# Patient Record
Sex: MALE | Race: WHITE | NOT HISPANIC OR LATINO | Employment: OTHER | URBAN - NONMETROPOLITAN AREA
[De-identification: names, ages, dates, MRNs, and addresses within clinical notes are randomized per-mention and may not be internally consistent; named-entity substitution may affect disease eponyms.]

---

## 2023-02-18 ENCOUNTER — HOSPITAL ENCOUNTER (EMERGENCY)
Facility: HOSPITAL | Age: 83
Discharge: HOME | End: 2023-02-18
Attending: EMERGENCY MEDICINE | Admitting: EMERGENCY MEDICINE

## 2023-02-18 VITALS
DIASTOLIC BLOOD PRESSURE: 75 MMHG | WEIGHT: 189.38 LBS | SYSTOLIC BLOOD PRESSURE: 140 MMHG | HEART RATE: 69 BPM | TEMPERATURE: 97.9 F | RESPIRATION RATE: 18 BRPM | OXYGEN SATURATION: 96 %

## 2023-02-18 DIAGNOSIS — T83.9XXA COMPLICATION OF FOLEY CATHETER, INITIAL ENCOUNTER (HCC): ICD-10-CM

## 2023-02-18 DIAGNOSIS — R31.9 HEMATURIA: Primary | ICD-10-CM

## 2023-02-18 LAB
ALBUMIN SERPL BCP-MCNC: 3.8 G/DL (ref 3.5–5)
ALP SERPL-CCNC: 76 U/L (ref 34–104)
ALT SERPL W P-5'-P-CCNC: 12 U/L (ref 7–52)
ANION GAP SERPL CALCULATED.3IONS-SCNC: 3 MMOL/L (ref 4–13)
APTT PPP: 35 SECONDS (ref 23–37)
AST SERPL W P-5'-P-CCNC: 21 U/L (ref 13–39)
BASOPHILS # BLD AUTO: 0.05 THOUSANDS/ÂΜL (ref 0–0.1)
BASOPHILS NFR BLD AUTO: 1 % (ref 0–1)
BILIRUB SERPL-MCNC: 1.53 MG/DL (ref 0.2–1)
BUN SERPL-MCNC: 21 MG/DL (ref 5–25)
CALCIUM SERPL-MCNC: 9.2 MG/DL (ref 8.4–10.2)
CHLORIDE SERPL-SCNC: 105 MMOL/L (ref 96–108)
CO2 SERPL-SCNC: 27 MMOL/L (ref 21–32)
CREAT SERPL-MCNC: 1.02 MG/DL (ref 0.6–1.3)
EOSINOPHIL # BLD AUTO: 0.06 THOUSAND/ÂΜL (ref 0–0.61)
EOSINOPHIL NFR BLD AUTO: 1 % (ref 0–6)
ERYTHROCYTE [DISTWIDTH] IN BLOOD BY AUTOMATED COUNT: 13.2 % (ref 11.6–15.1)
GFR SERPL CREATININE-BSD FRML MDRD: 69 ML/MIN/1.73SQ M
GLUCOSE SERPL-MCNC: 131 MG/DL (ref 65–140)
HCT VFR BLD AUTO: 43.5 % (ref 36.5–49.3)
HGB BLD-MCNC: 14.3 G/DL (ref 12–17)
IMM GRANULOCYTES # BLD AUTO: 0.03 THOUSAND/UL (ref 0–0.2)
IMM GRANULOCYTES NFR BLD AUTO: 0 % (ref 0–2)
INR PPP: 0.88 (ref 0.84–1.19)
LYMPHOCYTES # BLD AUTO: 0.97 THOUSANDS/ÂΜL (ref 0.6–4.47)
LYMPHOCYTES NFR BLD AUTO: 13 % (ref 14–44)
MCH RBC QN AUTO: 30.4 PG (ref 26.8–34.3)
MCHC RBC AUTO-ENTMCNC: 32.9 G/DL (ref 31.4–37.4)
MCV RBC AUTO: 92 FL (ref 82–98)
MONOCYTES # BLD AUTO: 0.73 THOUSAND/ÂΜL (ref 0.17–1.22)
MONOCYTES NFR BLD AUTO: 10 % (ref 4–12)
NEUTROPHILS # BLD AUTO: 5.84 THOUSANDS/ÂΜL (ref 1.85–7.62)
NEUTS SEG NFR BLD AUTO: 75 % (ref 43–75)
NRBC BLD AUTO-RTO: 0 /100 WBCS
PLATELET # BLD AUTO: 169 THOUSANDS/UL (ref 149–390)
PMV BLD AUTO: 10 FL (ref 8.9–12.7)
POTASSIUM SERPL-SCNC: 3.7 MMOL/L (ref 3.5–5.3)
PROT SERPL-MCNC: 6.7 G/DL (ref 6.4–8.4)
PROTHROMBIN TIME: 12.1 SECONDS (ref 11.6–14.5)
RBC # BLD AUTO: 4.71 MILLION/UL (ref 3.88–5.62)
SODIUM SERPL-SCNC: 135 MMOL/L (ref 135–147)
WBC # BLD AUTO: 7.68 THOUSAND/UL (ref 4.31–10.16)

## 2023-02-18 RX ORDER — LEVOTHYROXINE SODIUM 0.1 MG/1
100 TABLET ORAL DAILY
COMMUNITY

## 2023-02-18 RX ORDER — MULTIVITAMIN
1 TABLET ORAL DAILY
COMMUNITY

## 2023-02-18 RX ORDER — CETIRIZINE HYDROCHLORIDE 10 MG/1
CAPSULE, LIQUID FILLED ORAL
COMMUNITY

## 2023-02-18 RX ORDER — VIT C/B6/B5/MAGNESIUM/HERB 173 50-5-6-5MG
CAPSULE ORAL
COMMUNITY

## 2023-02-18 NOTE — ED CARE HANDOFF
Emergency Department Sign Out Note        Sign out and transfer of care from Dr Sutton Maris  See Separate Emergency Department note  The patient, Zach Wells, was evaluated by the previous provider for hematuria  Workup Completed:  CBC, CMP, PT/INR wnl  Undergoing CBI  ED Course / Workup Pending (followup): Reevaluate after CBI  ED Course as of 02/18/23 0942   Sat Feb 18, 2023   0938 Urine markedly cleared up  Still a very slight pink twinge, but no clots in the urine  Patient feels well  Plan to discharge  Procedures  Medical Decision Making  Patient reevaluated after CBI  Urine markedly cleared up  I did leave a message with the answering service at patient's urology office at [facility] for outpatient follow-up and to discuss patient's visit  No return call yet and patient was eager to leave  I advised patient call urologist's office on Monday morning  Return precautions were discussed  Patient discharged in stable condition  Complication of Sifuentes catheter, initial encounter [facility]): acute illness or injury  Hematuria: acute illness or injury  Amount and/or Complexity of Data Reviewed  Labs: ordered  Disposition  Final diagnoses:   Hematuria   Complication of Sifuentes catheter, initial encounter [facility])     Time reflects when diagnosis was documented in both MDM as applicable and the Disposition within this note     Time User Action Codes Description Comment    2/18/2023  5:37 AM Viola Hall Add [R31 9] Hematuria     2/18/2023  9:40 AM Marquis Sheth Add [T83  9XXA] Complication of Sifuentes catheter, initial encounter [facility])       ED Disposition     ED Disposition   Discharge    Condition   Stable    Date/Time   Sat Feb 18, 2023  5:37 AM    Comment   Zach Wells discharge to home/self care                 Follow-up Information     Follow up With Specialties Details Why Contact Info    Erika Jones MD [facility]  [address] [place] [address]          Patient's Medications   Discharge Prescriptions    No medications on file     No discharge procedures on file         ED Provider  Electronically Signed by     Mary Ceron MD  02/18/23 0654

## 2023-02-18 NOTE — ED PROVIDER NOTES
History  Chief Complaint   Patient presents with   • Blood in Urine     Pt reports he was seen at urology yesterday for scope and kwon insertion, reports hematuria since      Patient is an 82-year-old male presenting to the emergency department complaining of hematuria, patient was seen by his urologist yesterday and had a Kwon catheter placed for urinary retention with large postvoid residual, he had a scope performed in the office as well, he reports the first 2 times he emptied his leg bag it was clear yellow urine, however the last 2-3 have been bloody, he denies any fevers, no nausea or vomiting, denies any pain          Prior to Admission Medications   Prescriptions Last Dose Informant Patient Reported? Taking? Apple Cider Vinegar 188 MG CAPS   Yes Yes   Sig: Take by mouth   Cetirizine HCl (ZyrTEC Allergy) 10 MG CAPS   Yes Yes   Sig: Take by mouth   Multiple Vitamin (multivitamin) tablet   Yes Yes   Sig: Take 1 tablet by mouth daily   Turmeric 500 MG CAPS   Yes Yes   Sig: Take by mouth   levothyroxine 100 mcg tablet   Yes Yes   Sig: Take 100 mcg by mouth daily      Facility-Administered Medications: None       History reviewed  No pertinent past medical history  History reviewed  No pertinent surgical history  History reviewed  No pertinent family history  I have reviewed and agree with the history as documented  E-Cigarette/Vaping     E-Cigarette/Vaping Substances     Social History     Tobacco Use   • Smoking status: Never   • Smokeless tobacco: Never   Substance Use Topics   • Alcohol use: Never   • Drug use: Not Currently       Review of Systems   Constitutional: Negative  HENT: Negative  Eyes: Negative  Respiratory: Negative  Cardiovascular: Negative  Gastrointestinal: Negative  Endocrine: Negative  Genitourinary: Positive for hematuria  Musculoskeletal: Negative  Skin: Negative  Allergic/Immunologic: Negative  Neurological: Negative      Hematological: Negative  Psychiatric/Behavioral: Negative  Physical Exam  Physical Exam  Constitutional:       Appearance: He is well-developed  HENT:      Head: Normocephalic and atraumatic  Eyes:      Conjunctiva/sclera: Conjunctivae normal       Pupils: Pupils are equal, round, and reactive to light  Cardiovascular:      Rate and Rhythm: Normal rate  Pulmonary:      Effort: Pulmonary effort is normal    Abdominal:      Palpations: Abdomen is soft  Genitourinary:     Comments: Sifuentes catheter in place, clear blood-tinged urine noted in bag  Musculoskeletal:         General: Normal range of motion  Cervical back: Normal range of motion and neck supple  Skin:     General: Skin is warm and dry  Neurological:      Mental Status: He is alert and oriented to person, place, and time           Vital Signs  ED Triage Vitals   Temperature Pulse Respirations Blood Pressure SpO2   02/18/23 0432 02/18/23 0433 02/18/23 0432 02/18/23 0433 02/18/23 0432   97 9 °F (36 6 °C) 78 16 166/82 97 %      Temp Source Heart Rate Source Patient Position - Orthostatic VS BP Location FiO2 (%)   02/18/23 0432 -- 02/18/23 0432 02/18/23 0432 --   Temporal  Lying Right arm       Pain Score       02/18/23 0432       No Pain           Vitals:    02/18/23 0433 02/18/23 0530 02/18/23 0545 02/18/23 0600   BP: 166/82 121/69 120/71 121/70   Pulse: 78 67 66 63   Patient Position - Orthostatic VS:             Visual Acuity      ED Medications  Medications - No data to display    Diagnostic Studies  Results Reviewed     Procedure Component Value Units Date/Time    Comprehensive metabolic panel [[id number]]  (Abnormal) Collected: 02/18/23 0508    Lab Status: Final result Specimen: Blood from Arm, Right Updated: 02/18/23 0532     Sodium 135 mmol/L      Potassium 3 7 mmol/L      Chloride 105 mmol/L      CO2 27 mmol/L      ANION GAP 3 mmol/L      BUN 21 mg/dL      Creatinine 1 02 mg/dL      Glucose 131 mg/dL      Calcium 9 2 mg/dL      AST 21 U/L ALT 12 U/L      Alkaline Phosphatase 76 U/L      Total Protein 6 7 g/dL      Albumin 3 8 g/dL      Total Bilirubin 1 53 mg/dL      eGFR 69 ml/min/1 73sq m     Narrative:      [place] guidelines for Chronic Kidney Disease (CKD):   •  Stage 1 with normal or high GFR (GFR > 90 mL/min/1 73 square meters)  •  Stage 2 Mild CKD (GFR = 60-89 mL/min/1 73 square meters)  •  Stage 3A Moderate CKD (GFR = 45-59 mL/min/1 73 square meters)  •  Stage 3B Moderate CKD (GFR = 30-44 mL/min/1 73 square meters)  •  Stage 4 Severe CKD (GFR = 15-29 mL/min/1 73 square meters)  •  Stage 5 End Stage CKD (GFR <15 mL/min/1 73 square meters)  Note: GFR calculation is accurate only with a steady state creatinine    Protime-INR [[id number]]  (Normal) Collected: 02/18/23 0508    Lab Status: Final result Specimen: Blood from Arm, Right Updated: 02/18/23 0528     Protime 12 1 seconds      INR 0 88    APTT [[id number]]  (Normal) Collected: 02/18/23 0508    Lab Status: Final result Specimen: Blood from Arm, Right Updated: 02/18/23 0528     PTT 35 seconds     CBC and differential [[id number]]  (Abnormal) Collected: 02/18/23 0508    Lab Status: Final result Specimen: Blood from Arm, Right Updated: 02/18/23 0515     WBC 7 68 Thousand/uL      RBC 4 71 Million/uL      Hemoglobin 14 3 g/dL      Hematocrit 43 5 %      MCV 92 fL      MCH 30 4 pg      MCHC 32 9 g/dL      RDW 13 2 %      MPV 10 0 fL      Platelets 864 Thousands/uL      nRBC 0 /100 WBCs      Neutrophils Relative 75 %      Immat GRANS % 0 %      Lymphocytes Relative 13 %      Monocytes Relative 10 %      Eosinophils Relative 1 %      Basophils Relative 1 %      Neutrophils Absolute 5 84 Thousands/µL      Immature Grans Absolute 0 03 Thousand/uL      Lymphocytes Absolute 0 97 Thousands/µL      Monocytes Absolute 0 73 Thousand/µL      Eosinophils Absolute 0 06 Thousand/µL      Basophils Absolute 0 05 Thousands/µL                  No orders to display Procedures  Procedures         ED Course                               SBIRT 22yo+    Flowsheet Row Most Recent Value   SBIRT (25 yo +)    In order to provide better care to our patients, we are screening all of our patients for alcohol and drug use  Would it be okay to ask you these screening questions? Yes Filed at: 02/18/2023 0435   Initial Alcohol Screen: US AUDIT-C     1  How often do you have a drink containing alcohol? 1 Filed at: 02/18/2023 0435   2  How many drinks containing alcohol do you have on a typical day you are drinking? 0 Filed at: 02/18/2023 0435   3a  Male UNDER 65: How often do you have five or more drinks on one occasion? 0 Filed at: 02/18/2023 0435   3b  FEMALE Any Age, or MALE 65+: How often do you have 4 or more drinks on one occassion? 0 Filed at: 02/18/2023 0435   Audit-C Score 1 Filed at: 02/18/2023 4953   ALLISON: How many times in the past year have you    Used an illegal drug or used a prescription medication for non-medical reasons?  Never Filed at: 02/18/2023 0435                    Medical Decision Making  Patient is an 32-year-old male presenting to the emergency department complaining of hematuria, was seen by urology yesterday, had a bladder scope performed in office and a Sifuentes catheter placed due to urinary retention, initially urine was yellow, however the last few times she went to change the bag he noticed it to be bloody, he denies any pain, no fever, on exam patient does have blood-tinged urine in a leg bag, abdomen is soft and nontender, vital signs are stable and he appears in no acute distress, hemoglobin is stable laboratory findings otherwise unremarkable, CBI being performed with anticipation that patient will be discharged home with advised to follow-up with urology, patient and family at bedside acknowledge understanding and agreement with this plan    Hematuria: acute illness or injury  Amount and/or Complexity of Data Reviewed  External Data Reviewed: notes  Labs: ordered  Disposition  Final diagnoses:   Hematuria     Time reflects when diagnosis was documented in both MDM as applicable and the Disposition within this note     Time User Action Codes Description Comment    2/18/2023  5:37 AM Silvia Rangel Add [R31 9] Hematuria       ED Disposition     ED Disposition   Discharge    Condition   Stable    Date/Time   Sat Feb 18, 2023  5:37 AM    Comment   Silvia Singh discharge to home/self care  Follow-up Information     Follow up With Specialties Details Why Contact Info    Apolinar Jackson MD [facility]  [address] [place] [address]            Patient's Medications   Discharge Prescriptions    No medications on file       No discharge procedures on file      PDMP Review     None          ED Provider  Electronically Signed by           Gisselle Guzman DO  02/18/23 6206

## 2023-02-19 ENCOUNTER — HOSPITAL ENCOUNTER (EMERGENCY)
Facility: HOSPITAL | Age: 83
Discharge: OTHER INSTITUTION | End: 2023-02-20
Attending: EMERGENCY MEDICINE | Admitting: EMERGENCY MEDICINE

## 2023-02-19 DIAGNOSIS — R31.9 HEMATURIA: Primary | ICD-10-CM

## 2023-02-19 DIAGNOSIS — T83.9XXA COMPLICATION OF FOLEY CATHETER, INITIAL ENCOUNTER (HCC): ICD-10-CM

## 2023-02-19 LAB
ALBUMIN SERPL BCP-MCNC: 3.6 G/DL (ref 3.5–5)
ALP SERPL-CCNC: 65 U/L (ref 34–104)
ALT SERPL W P-5'-P-CCNC: 15 U/L (ref 7–52)
ANION GAP SERPL CALCULATED.3IONS-SCNC: 5 MMOL/L (ref 4–13)
AST SERPL W P-5'-P-CCNC: 23 U/L (ref 13–39)
BASOPHILS # BLD AUTO: 0.04 THOUSANDS/ÂΜL (ref 0–0.1)
BASOPHILS NFR BLD AUTO: 0 % (ref 0–1)
BILIRUB SERPL-MCNC: 1.53 MG/DL (ref 0.2–1)
BUN SERPL-MCNC: 23 MG/DL (ref 5–25)
CALCIUM SERPL-MCNC: 9 MG/DL (ref 8.4–10.2)
CHLORIDE SERPL-SCNC: 104 MMOL/L (ref 96–108)
CO2 SERPL-SCNC: 26 MMOL/L (ref 21–32)
CREAT SERPL-MCNC: 1.13 MG/DL (ref 0.6–1.3)
EOSINOPHIL # BLD AUTO: 0.14 THOUSAND/ÂΜL (ref 0–0.61)
EOSINOPHIL NFR BLD AUTO: 1 % (ref 0–6)
ERYTHROCYTE [DISTWIDTH] IN BLOOD BY AUTOMATED COUNT: 13.3 % (ref 11.6–15.1)
GFR SERPL CREATININE-BSD FRML MDRD: 61 ML/MIN/1.73SQ M
GLUCOSE SERPL-MCNC: 119 MG/DL (ref 65–140)
HCT VFR BLD AUTO: 41 % (ref 36.5–49.3)
HGB BLD-MCNC: 13.4 G/DL (ref 12–17)
IMM GRANULOCYTES # BLD AUTO: 0.03 THOUSAND/UL (ref 0–0.2)
IMM GRANULOCYTES NFR BLD AUTO: 0 % (ref 0–2)
LYMPHOCYTES # BLD AUTO: 0.75 THOUSANDS/ÂΜL (ref 0.6–4.47)
LYMPHOCYTES NFR BLD AUTO: 7 % (ref 14–44)
MCH RBC QN AUTO: 30.1 PG (ref 26.8–34.3)
MCHC RBC AUTO-ENTMCNC: 32.7 G/DL (ref 31.4–37.4)
MCV RBC AUTO: 92 FL (ref 82–98)
MONOCYTES # BLD AUTO: 0.93 THOUSAND/ÂΜL (ref 0.17–1.22)
MONOCYTES NFR BLD AUTO: 9 % (ref 4–12)
NEUTROPHILS # BLD AUTO: 9 THOUSANDS/ÂΜL (ref 1.85–7.62)
NEUTS SEG NFR BLD AUTO: 83 % (ref 43–75)
NRBC BLD AUTO-RTO: 0 /100 WBCS
PLATELET # BLD AUTO: 165 THOUSANDS/UL (ref 149–390)
PMV BLD AUTO: 9.9 FL (ref 8.9–12.7)
POTASSIUM SERPL-SCNC: 3.7 MMOL/L (ref 3.5–5.3)
PROT SERPL-MCNC: 6.8 G/DL (ref 6.4–8.4)
RBC # BLD AUTO: 4.45 MILLION/UL (ref 3.88–5.62)
SODIUM SERPL-SCNC: 135 MMOL/L (ref 135–147)
WBC # BLD AUTO: 10.89 THOUSAND/UL (ref 4.31–10.16)

## 2023-02-19 RX ORDER — LIDOCAINE HYDROCHLORIDE 20 MG/ML
JELLY TOPICAL ONCE
Status: DISCONTINUED | OUTPATIENT
Start: 2023-02-19 | End: 2023-02-19

## 2023-02-19 RX ORDER — LIDOCAINE HYDROCHLORIDE 20 MG/ML
1 JELLY TOPICAL ONCE
Status: COMPLETED | OUTPATIENT
Start: 2023-02-19 | End: 2023-02-19

## 2023-02-19 RX ADMIN — LIDOCAINE HYDROCHLORIDE 1 APPLICATION.: 20 JELLY TOPICAL at 14:20

## 2023-02-19 RX ADMIN — LIDOCAINE HYDROCHLORIDE 1 APPLICATION.: 20 JELLY TOPICAL at 14:58

## 2023-02-19 RX ADMIN — LIDOCAINE HYDROCHLORIDE 1 APPLICATION.: 20 JELLY TOPICAL at 14:57

## 2023-02-19 NOTE — ED NOTES
Sifuentes 3 way 20fr catheter attempted negro blood and clots present unable to pass catheter by prostate  18fr coude catheter attempted both attempts unsuccessful       Nader Wilkes RN  02/19/23 6107

## 2023-02-19 NOTE — ED NOTES
Manual irrigation of kwon catheter 300ml NSS infused 300ml returned minimal clots present  Urine remains punch colored       Blossom Caretr, CLAUDIA  02/19/23 7174

## 2023-02-19 NOTE — ED CARE HANDOFF
Emergency Department Sign Out Note        Sign out and transfer of care from Dr Harshad Torres  See Separate Emergency Department note  The patient, Jennifer Swartz, was evaluated by the previous provider for hematuria  Workup Completed:  Initial evaluation and placement of Sifuentes catheter    ED Course / Workup Pending (followup): Patient remained stable with bloody urine  At the time that patient was signed out to me, awaiting callback from [facility] to accept patient for transfer  Patient has now been accepted by Dr Madhavi Johnson  Awaiting transport at this time  ED Course as of 02/19/23 1812   Charity Sneed Feb 19, 2023   6520 Patient accepted at [facility] by Dr Madhavi Johnson medicine     Procedures  MDM        Disposition  Final diagnoses:   Hematuria   Complication of Sifuentes catheter, initial encounter [facility])     Time reflects when diagnosis was documented in both MDM as applicable and the Disposition within this note     Time User Action Codes Description Comment    2/19/2023  4:54 PM Anitra Underwood [R31 9] Hematuria     2/19/2023  4:54 PM Rod Reich  9XXA] Complication of Sifuentes catheter, initial encounter [facility])       ED Disposition     ED Disposition   Transfer to Another Facility-In Network    Condition   --    Date/Time   Sun Feb 19, 2023  3:50 PM    Comment   Jennifer Swartz should be transferred out to [facility]             MD Documentation    Tucker Kenney Most Recent Value   Patient Condition The patient has been stabilized such that within reasonable medical probability, no material deterioration of the patient condition or the condition of the unborn child(mykel) is likely to result from the transfer   Reason for Transfer Level of Care needed not available at this facility   Benefits of Transfer Specialized equipment and/or services available at the receiving facility (Include comment)________________________, Continuity of care, Patient preference   Risks of Transfer Potential for delay in receiving treatment, Potential deterioration of medical condition, Loss of IV, Increased discomfort during transfer, Possible worsening of condition or death during transfer   Accepting Physician Dr My Sethi Name, [facility]    (Name & Tel number) Juliana Herrera   Provider Certification General risk, such as traffic hazards, adverse weather conditions, rough terrain or turbulence, possible failure of equipment (including vehicle or aircraft), or consequences of actions of persons outside the control of the transport personnel, Unanticipated needs of medical equipment and personnel during transport, Risk of worsening condition, The possibility of a transport vehicle being unavailable      RN Documentation    72 Katya Garrison Name, [facility]    (Name & Tel number) Juliana Castillo      Follow-up Information    None       Patient's Medications   Discharge Prescriptions    No medications on file     No discharge procedures on file         ED Provider  Electronically Signed by     Olaf Yepez MD  02/19/23 3275

## 2023-02-19 NOTE — EMTALA/ACUTE CARE TRANSFER
[address] [place], [place] [address]  [facility] [address]  Dept: [phone]      EMTALA TRANSFER CONSENT    NAME Bob Lopez                                         1940                              MRN [id number]    I have been informed of my rights regarding examination, treatment, and transfer   by Dr Lynnette Keen MD    Benefits: Specialized equipment and/or services available at the receiving facility (Include comment)________________________, Continuity of care, Patient preference    Risks: Potential for delay in receiving treatment, Potential deterioration of medical condition, Loss of IV, Increased discomfort during transfer, Possible worsening of condition or death during transfer      Consent for Transfer:  I acknowledge that my medical condition has been evaluated and explained to me by the emergency department physician or other qualified medical person and/or my attending physician, who has recommended that I be transferred to the service of  Accepting Physician: Dr Janette Main at [address] Name, [address] : [facility]  The above potential benefits of such transfer, the potential risks associated with such transfer, and the probable risks of not being transferred have been explained to me, and I fully understand them  The doctor has explained that, in my case, the benefits of transfer outweigh the risks  I agree to be transferred  I authorize the performance of emergency medical procedures and treatments upon me in both transit and upon arrival at the receiving facility  Additionally, I authorize the release of any and all medical records to the receiving facility and request they be transported with me, if possible  I understand that the safest mode of transportation during a medical emergency is an ambulance and that the Hospital advocates the use of this mode of transport   Risks of traveling to the receiving facility by car, including absence of medical control, life sustaining equipment, such as oxygen, and medical personnel has been explained to me and I fully understand them  (RAHUL CORRECT BOX BELOW)  [X]  I consent to the stated transfer and to be transported by ambulance/helicopter  [  ]  I consent to the stated transfer, but refuse transportation by ambulance and accept full responsibility for my transportation by car  I understand the risks of non-ambulance transfers and I exonerate the Hospital and its staff from any deterioration in my condition that results from this refusal     X___________________________________________    DATE  23  TIME________  Signature of patient or legally responsible individual signing on patient behalf           RELATIONSHIP TO PATIENT_________________________          Provider Certification    NAME Behzad Kim                                        [facility] 1940                              MRN [id number]    A medical screening exam was performed on the above named patient  Based on the examination:    Condition Necessitating Transfer The primary encounter diagnosis was Hematuria  A diagnosis of Complication of Sifuentes catheter, initial encounter [facility]) was also pertinent to this visit      Patient Condition: The patient has been stabilized such that within reasonable medical probability, no material deterioration of the patient condition or the condition of the unborn child(mykel) is likely to result from the transfer    Reason for Transfer: Level of Care needed not available at this facility    Transfer Requirements: [address]   · Space available and qualified personnel available for treatment as acknowledged by Priyanka Rivera  · Agreed to accept transfer and to provide appropriate medical treatment as acknowledged by       Dr Shay Downs  · Appropriate medical records of the examination and treatment of the patient are provided at the time of transfer   [address] _______  · Transfer will be performed by qualified personnel from    and appropriate transfer equipment as required, including the use of necessary and appropriate life support measures  Provider Certification: I have examined the patient and explained the following risks and benefits of being transferred/refusing transfer to the patient/family:  General risk, such as traffic hazards, adverse weather conditions, rough terrain or turbulence, possible failure of equipment (including vehicle or aircraft), or consequences of actions of persons outside the control of the transport personnel, Unanticipated needs of medical equipment and personnel during transport, Risk of worsening condition, The possibility of a transport vehicle being unavailable      Based on these reasonable risks and benefits to the patient and/or the unborn child(mykel), and based upon the information available at the time of the patient’s examination, I certify that the medical benefits reasonably to be expected from the provision of appropriate medical treatments at another medical facility outweigh the increasing risks, if any, to the individual’s medical condition, and in the case of labor to the unborn child, from effecting the transfer      X____________________________________________ DATE 02/19/23        TIME_______      ORIGINAL - SEND TO MEDICAL RECORDS   COPY - SEND WITH PATIENT DURING TRANSFER

## 2023-02-19 NOTE — ED NOTES
Sifuentes catheter manually irrigated with 300ml of normal saline  Catheter returned 300ml of punch colored urine with minimal amount of clots        Nader Wilkes RN  02/19/23 5193

## 2023-02-19 NOTE — ED PROVIDER NOTES
History  Chief Complaint   Patient presents with   • Medical Problem     Pt arrives reporting he was recently seen here and had catheter in place for urinary retention from enlarged prostate  Pt reports that today he decided he didn't want the catheter anymore so he pulled it out himself and bled all over  HPI   Two days ago (2/17), patient was Sifuentes placed by his urologist for urinary retention  He came in yesterday for hematuria after the Sifuentes placement  He received CBI and was discharged with a 3 way Sifuentes with instructions to follow-up with his urologist  Around noon today, patient pulled out his Sifuentes because he was frustrated by it and did not want it to be left in  Since then, he has regretted his decision  He started having penile bleeding since the Sifuentes removal and unable to void  Prior to Admission Medications   Prescriptions Last Dose Informant Patient Reported? Taking? Apple Cider Vinegar 188 MG CAPS   Yes No   Sig: Take by mouth   Cetirizine HCl (ZyrTEC Allergy) 10 MG CAPS   Yes No   Sig: Take by mouth   Multiple Vitamin (multivitamin) tablet   Yes No   Sig: Take 1 tablet by mouth daily   Turmeric 500 MG CAPS   Yes No   Sig: Take by mouth   levothyroxine 100 mcg tablet   Yes No   Sig: Take 100 mcg by mouth daily      Facility-Administered Medications: None       Past Medical History:   Diagnosis Date   • Benign prostatic hyperplasia with nocturia    • Elevated PSA    • Hypothyroidism        History reviewed  No pertinent surgical history  History reviewed  No pertinent family history  I have reviewed and agree with the history as documented      E-Cigarette/Vaping   • E-Cigarette Use Never User      E-Cigarette/Vaping Substances     Social History     Tobacco Use   • Smoking status: Never   • Smokeless tobacco: Never   Vaping Use   • Vaping Use: Never used   Substance Use Topics   • Alcohol use: Never   • Drug use: Not Currently       Review of Systems   Constitutional: Negative for chills and fever  HENT: Negative for ear pain and sore throat  Eyes: Negative for pain and visual disturbance  Respiratory: Negative for cough and shortness of breath  Cardiovascular: Negative for chest pain and palpitations  Gastrointestinal: Negative for abdominal pain and vomiting  Genitourinary: Positive for hematuria and penile pain  Negative for dysuria  Musculoskeletal: Negative for arthralgias and back pain  Skin: Negative for color change and rash  Neurological: Negative for seizures and syncope  All other systems reviewed and are negative  Physical Exam  Physical Exam  Vitals and nursing note reviewed  Constitutional:       Appearance: He is well-developed  HENT:      Head: Normocephalic and atraumatic  Right Ear: External ear normal       Left Ear: External ear normal       Nose: Nose normal    Eyes:      Conjunctiva/sclera: Conjunctivae normal    Cardiovascular:      Rate and Rhythm: Normal rate and regular rhythm  Pulmonary:      Effort: Pulmonary effort is normal  No respiratory distress  Breath sounds: Normal breath sounds  Abdominal:      Palpations: Abdomen is soft  Tenderness: There is no abdominal tenderness  Genitourinary:     Comments: Bleeding from penile region  Musculoskeletal:      Cervical back: Normal range of motion and neck supple  Skin:     General: Skin is warm and dry  Neurological:      General: No focal deficit present  Mental Status: He is alert  Mental status is at baseline         Vital Signs  ED Triage Vitals   Temperature Pulse Respirations Blood Pressure SpO2   02/19/23 1335 02/19/23 1335 02/19/23 1335 02/19/23 1335 02/19/23 1335   98 7 °F (37 1 °C) 83 18 (!) 182/86 98 %      Temp src Heart Rate Source Patient Position - Orthostatic VS BP Location FiO2 (%)   -- 02/19/23 1627 02/19/23 1627 02/19/23 1627 --    Monitor Lying Right arm       Pain Score       02/19/23 1335       No Pain           Vitals:    02/19/23 1745 02/19/23 1800 02/19/23 1815 02/19/23 1830   BP: (!) 172/102 152/80 151/84 147/80   Pulse: 84 85 84 83   Patient Position - Orthostatic VS:             Visual Acuity      ED Medications  Medications   lidocaine (URO-JET) 2 % urethral/mucosal gel 1 application (1 application Urethral Given 2/19/23 1420)   lidocaine (URO-JET) 2 % urethral/mucosal gel 1 application (1 application Urethral Given 2/19/23 1458)   lidocaine (URO-JET) 2 % urethral/mucosal gel 1 application (1 application Urethral Given 2/19/23 1457)       Diagnostic Studies  Results Reviewed     Procedure Component Value Units Date/Time    Comprehensive metabolic panel [[id number]]  (Abnormal) Collected: 02/19/23 1549    Lab Status: Final result Specimen: Blood from Arm, Left Updated: 02/19/23 1615     Sodium 135 mmol/L      Potassium 3 7 mmol/L      Chloride 104 mmol/L      CO2 26 mmol/L      ANION GAP 5 mmol/L      BUN 23 mg/dL      Creatinine 1 13 mg/dL      Glucose 119 mg/dL      Calcium 9 0 mg/dL      AST 23 U/L      ALT 15 U/L      Alkaline Phosphatase 65 U/L      Total Protein 6 8 g/dL      Albumin 3 6 g/dL      Total Bilirubin 1 53 mg/dL      eGFR 61 ml/min/1 73sq m     Narrative:      [place] guidelines for Chronic Kidney Disease (CKD):   •  Stage 1 with normal or high GFR (GFR > 90 mL/min/1 73 square meters)  •  Stage 2 Mild CKD (GFR = 60-89 mL/min/1 73 square meters)  •  Stage 3A Moderate CKD (GFR = 45-59 mL/min/1 73 square meters)  •  Stage 3B Moderate CKD (GFR = 30-44 mL/min/1 73 square meters)  •  Stage 4 Severe CKD (GFR = 15-29 mL/min/1 73 square meters)  •  Stage 5 End Stage CKD (GFR <15 mL/min/1 73 square meters)  Note: GFR calculation is accurate only with a steady state creatinine    CBC and differential [[id number]]  (Abnormal) Collected: 02/19/23 1549    Lab Status: Final result Specimen: Blood from Arm, Left Updated: 02/19/23 1555     WBC 10 89 Thousand/uL      RBC 4 45 Million/uL      Hemoglobin 13 4 g/dL Hematocrit 41 0 %      MCV 92 fL      MCH 30 1 pg      MCHC 32 7 g/dL      RDW 13 3 %      MPV 9 9 fL      Platelets 544 Thousands/uL      nRBC 0 /100 WBCs      Neutrophils Relative 83 %      Immat GRANS % 0 %      Lymphocytes Relative 7 %      Monocytes Relative 9 %      Eosinophils Relative 1 %      Basophils Relative 0 %      Neutrophils Absolute 9 00 Thousands/µL      Immature Grans Absolute 0 03 Thousand/uL      Lymphocytes Absolute 0 75 Thousands/µL      Monocytes Absolute 0 93 Thousand/µL      Eosinophils Absolute 0 14 Thousand/µL      Basophils Absolute 0 04 Thousands/µL                Procedures  Procedures       ED Course  ED Course as of 02/19/23 Sultana Ashton Feb 19, 2023   1433 Nurse unable to pass 20French 3 way Sifuentes or 18French coude  Discussing with on-call urology, Philomena Sapp PA-C    9640 On-call urology recommends administering 2x lido urojets, and try placing 3-way Sifuentes coude tip  I spoke to nursing supervisor however there is no 3-way Sifuentes coude  Discussed w urology again and she recommends trying the 3-way Sifuentes again as patient is likely to need CBI  1540 I could not place 3 way Sifuentes catheter  18[address] [place] attempted and able to successfully pass with drainage of hematuria and blood clots  Manually irrigation done multiple times with continued hematuria and some small clots  I discussed with on-call urology MICHAEL who recommends transferring for urologic evaluation  Patient prefers to go to [facility] WBC(!): 10 89   1558 Hemoglobin: 13 4   1651 I discussed with Dr Leigh Montaño urologist at [facility]  He recommends admission to hospitalist service at [facility]     0561 Still awaiting callback from hospitalist at [facility]  Signed out to Dr Kelsey Maurer to follow-up on transfer  Medical Decision Making  82M presenting with traumatic Sifuentes removal  +penile bleeding  Vitals stable   Attempted replacement of Sifuentes multiple times, via 3 way then normal coude  Discussed with on-call urology who made recommendations (see ED course for details)  Ultimately was able to place coude Sifuentes  Manual irrigation done to irrigate out clots, however unable to do CBI  Manual irrigation continued w persistent hematuria and some clots  Therefore, on-call urology recommended transferring to facility with urology available  Patient prefers to go to [facility] as his urologist is there  Transfer center order and call placed  Complication of Sifuentes catheter, initial encounter [facility]): acute illness or injury  Hematuria: acute illness or injury  Amount and/or Complexity of Data Reviewed  Labs: ordered  Decision-making details documented in ED Course  Risk  Prescription drug management  Disposition  Final diagnoses:   Hematuria   Complication of Sifuentes catheter, initial encounter [facility])       Patient's Medications   Discharge Prescriptions    No medications on file       No discharge procedures on file      PDMP Review     None          ED Provider  Electronically Signed by           Sulma Reddy MD  02/19/23 9732

## 2023-02-20 VITALS
TEMPERATURE: 98.7 F | WEIGHT: 189 LBS | HEIGHT: 71 IN | DIASTOLIC BLOOD PRESSURE: 85 MMHG | BODY MASS INDEX: 26.46 KG/M2 | HEART RATE: 81 BPM | SYSTOLIC BLOOD PRESSURE: 144 MMHG | OXYGEN SATURATION: 94 % | RESPIRATION RATE: 18 BRPM

## 2023-02-20 NOTE — ED NOTES
Catheter irrigated  NO clots noted  Mckenna Vu, [facility]  02/20/23 8239

## 2023-02-20 NOTE — ED NOTES
Catheter irrigated  Pt tolerated well  NO clots noted  Light red in color  Mckenna Culver, [facility]  02/19/23 0944

## 2023-02-20 NOTE — ED NOTES
Small amount of bleeding noted in meatus  Pt cleaned and new pad placed  Mckenna Foreman, [facility]  02/19/23 2889

## 2023-02-20 NOTE — ED NOTES
Catheter irrigated  Light red in color  One small clot noted  Pt tolerated well  Mckenna Raymundo RN  02/19/23 2126

## 2023-02-20 NOTE — ED CARE HANDOFF
Emergency Department Sign Out Note        Sign out and transfer of care from Dr Nadia Petit  See Separate Emergency Department note  The patient, Sarina Leggett, was evaluated by the previous provider for Hematuria   Workup Completed:  Labs, placement of 3-way kwon catheter    ED Course / Workup Pending (followup): Awaiting transfer to [facility]  Transport scheduled for 0300  Procedures  MDM    Disposition  Final diagnoses:   Hematuria   Complication of Kwon catheter, initial encounter [facility])     Time reflects when diagnosis was documented in both MDM as applicable and the Disposition within this note     Time User Action Codes Description Comment    2/19/2023  4:54 PM Iggy Aquino [R31 9] Hematuria     2/19/2023  4:54 PM Salomón Chan, [facility]  9XXA] Complication of Kwon catheter, initial encounter [facility])       ED Disposition     ED Disposition   Transfer to Another Facility-In Network    Condition   --    Date/Time   Sun Feb 19, 2023  3:50 PM    Comment   Sarina Leggett should be transferred out to [facility]             MD Documentation    Riddhi Pap Most Recent Value   Patient Condition The patient has been stabilized such that within reasonable medical probability, no material deterioration of the patient condition or the condition of the unborn child(mykel) is likely to result from the transfer   Reason for Transfer Level of Care needed not available at this facility   Benefits of Transfer Specialized equipment and/or services available at the receiving facility (Include comment)________________________, Continuity of care, Patient preference   Risks of Transfer Potential for delay in receiving treatment, Potential deterioration of medical condition, Loss of IV, Increased discomfort during transfer, Possible worsening of condition or death during transfer   Accepting Physician Dr Serena Cooper Name, [facility]--CC    (Name & Tel number) Asha Torres   Sending MD Rodriguez Tyshawn   Provider Certification General risk, such as traffic hazards, adverse weather conditions, rough terrain or turbulence, possible failure of equipment (including vehicle or aircraft), or consequences of actions of persons outside the control of the transport personnel, Unanticipated needs of medical equipment and personnel during transport, Risk of worsening condition, The possibility of a transport vehicle being unavailable      RN Documentation    [address] Katya Watters [place] Name, [address]  [facility]--CC   Bed Assignment 5K 12A    (Name & Tel number) Monserrat Borden      Follow-up Information    None       Patient's Medications   Discharge Prescriptions    No medications on file     No discharge procedures on file         ED Provider  Electronically Signed by     Louisa Montilla DO  02/20/23 0842

## 2023-02-20 NOTE — ED NOTES
Catheter irrigated  Small clots noted  Light red in color  Pt tolerated well  Mckenna Gao, [facility]  02/20/23 6210

## 2023-02-20 NOTE — ED NOTES
Sifuentes catheter irrigated  Small tiny clots noted, light red in color  Mckenna Barr, [facility]  02/19/23 2050

## 2023-02-20 NOTE — ED NOTES
Irrigated catheter  NO blood clots noted  Light red in color  Pt tolerated well  Mckenna Tapia, [facility]  02/19/23 3161

## 2023-02-20 NOTE — ED NOTES
Catheter irrigated  NO clots noted  Mckenna Mccurdy, [facility]  02/20/23 4391

## 2023-02-20 NOTE — ED NOTES
Sifuentes catheter irrigated  NO clots noted  Light red color  Mckenna [place]  02/19/23 2012

## 2024-11-28 ENCOUNTER — HOSPITAL ENCOUNTER (EMERGENCY)
Facility: HOSPITAL | Age: 84
Discharge: HOME | End: 2024-11-28
Attending: STUDENT IN AN ORGANIZED HEALTH CARE EDUCATION/TRAINING PROGRAM
Payer: COMMERCIAL

## 2024-11-28 VITALS
WEIGHT: 196.21 LBS | DIASTOLIC BLOOD PRESSURE: 81 MMHG | SYSTOLIC BLOOD PRESSURE: 145 MMHG | OXYGEN SATURATION: 96 % | RESPIRATION RATE: 18 BRPM | TEMPERATURE: 98 F | BODY MASS INDEX: 27.37 KG/M2 | HEART RATE: 59 BPM

## 2024-11-28 DIAGNOSIS — M79.601 RIGHT ARM PAIN: Primary | ICD-10-CM

## 2024-11-28 LAB
2HR DELTA HS TROPONIN: 0 NG/L
ANION GAP SERPL CALCULATED.3IONS-SCNC: 6 MMOL/L (ref 4–13)
BASOPHILS # BLD AUTO: 0.03 THOUSANDS/ΜL (ref 0–0.1)
BASOPHILS NFR BLD AUTO: 1 % (ref 0–1)
BUN SERPL-MCNC: 15 MG/DL (ref 5–25)
CALCIUM SERPL-MCNC: 9 MG/DL (ref 8.4–10.2)
CARDIAC TROPONIN I PNL SERPL HS: 9 NG/L (ref ?–50)
CARDIAC TROPONIN I PNL SERPL HS: 9 NG/L (ref ?–50)
CHLORIDE SERPL-SCNC: 104 MMOL/L (ref 96–108)
CO2 SERPL-SCNC: 26 MMOL/L (ref 21–32)
CREAT SERPL-MCNC: 0.9 MG/DL (ref 0.6–1.3)
D DIMER PPP FEU-MCNC: 0.57 UG/ML FEU
EOSINOPHIL # BLD AUTO: 0.03 THOUSAND/ΜL (ref 0–0.61)
EOSINOPHIL NFR BLD AUTO: 1 % (ref 0–6)
ERYTHROCYTE [DISTWIDTH] IN BLOOD BY AUTOMATED COUNT: 13.1 % (ref 11.6–15.1)
GFR SERPL CREATININE-BSD FRML MDRD: 79 ML/MIN/1.73SQ M
GLUCOSE SERPL-MCNC: 97 MG/DL (ref 65–140)
HCT VFR BLD AUTO: 44.7 % (ref 36.5–49.3)
HGB BLD-MCNC: 15.1 G/DL (ref 12–17)
IMM GRANULOCYTES # BLD AUTO: 0.02 THOUSAND/UL (ref 0–0.2)
IMM GRANULOCYTES NFR BLD AUTO: 1 % (ref 0–2)
LYMPHOCYTES # BLD AUTO: 0.56 THOUSANDS/ΜL (ref 0.6–4.47)
LYMPHOCYTES NFR BLD AUTO: 13 % (ref 14–44)
MCH RBC QN AUTO: 30.9 PG (ref 26.8–34.3)
MCHC RBC AUTO-ENTMCNC: 33.8 G/DL (ref 31.4–37.4)
MCV RBC AUTO: 92 FL (ref 82–98)
MONOCYTES # BLD AUTO: 0.45 THOUSAND/ΜL (ref 0.17–1.22)
MONOCYTES NFR BLD AUTO: 10 % (ref 4–12)
NEUTROPHILS # BLD AUTO: 3.29 THOUSANDS/ΜL (ref 1.85–7.62)
NEUTS SEG NFR BLD AUTO: 74 % (ref 43–75)
NRBC BLD AUTO-RTO: 0 /100 WBCS
PLATELET # BLD AUTO: 161 THOUSANDS/UL (ref 149–390)
PMV BLD AUTO: 9.5 FL (ref 8.9–12.7)
POTASSIUM SERPL-SCNC: 4 MMOL/L (ref 3.5–5.3)
RBC # BLD AUTO: 4.88 MILLION/UL (ref 3.88–5.62)
SODIUM SERPL-SCNC: 136 MMOL/L (ref 135–147)
WBC # BLD AUTO: 4.38 THOUSAND/UL (ref 4.31–10.16)

## 2024-11-28 PROCEDURE — 36415 COLL VENOUS BLD VENIPUNCTURE: CPT | Performed by: STUDENT IN AN ORGANIZED HEALTH CARE EDUCATION/TRAINING PROGRAM

## 2024-11-28 PROCEDURE — 93005 ELECTROCARDIOGRAM TRACING: CPT

## 2024-11-28 PROCEDURE — 85025 COMPLETE CBC W/AUTO DIFF WBC: CPT | Performed by: STUDENT IN AN ORGANIZED HEALTH CARE EDUCATION/TRAINING PROGRAM

## 2024-11-28 PROCEDURE — 96374 THER/PROPH/DIAG INJ IV PUSH: CPT

## 2024-11-28 PROCEDURE — 99285 EMERGENCY DEPT VISIT HI MDM: CPT | Performed by: STUDENT IN AN ORGANIZED HEALTH CARE EDUCATION/TRAINING PROGRAM

## 2024-11-28 PROCEDURE — 80048 BASIC METABOLIC PNL TOTAL CA: CPT | Performed by: STUDENT IN AN ORGANIZED HEALTH CARE EDUCATION/TRAINING PROGRAM

## 2024-11-28 PROCEDURE — 99283 EMERGENCY DEPT VISIT LOW MDM: CPT

## 2024-11-28 PROCEDURE — 85379 FIBRIN DEGRADATION QUANT: CPT | Performed by: STUDENT IN AN ORGANIZED HEALTH CARE EDUCATION/TRAINING PROGRAM

## 2024-11-28 PROCEDURE — 84484 ASSAY OF TROPONIN QUANT: CPT | Performed by: STUDENT IN AN ORGANIZED HEALTH CARE EDUCATION/TRAINING PROGRAM

## 2024-11-28 RX ORDER — KETOROLAC TROMETHAMINE 30 MG/ML
15 INJECTION, SOLUTION INTRAMUSCULAR; INTRAVENOUS ONCE
Status: COMPLETED | OUTPATIENT
Start: 2024-11-28 | End: 2024-11-28

## 2024-11-28 RX ADMIN — KETOROLAC TROMETHAMINE 15 MG: 30 INJECTION, SOLUTION INTRAMUSCULAR; INTRAVENOUS at 10:22

## 2024-11-28 NOTE — DISCHARGE INSTRUCTIONS
The laboratory studies, ECG that were obtained did not show any significant abnormalities.    For pain, you can take ibuprofen 600 mg every 6 hours and Tylenol 1000 mg every 6 hours.    Rest the arm for the next few days.  Follow-up with your primary care provider.  Return to the emergency department for any concerning signs or symptoms.

## 2024-11-28 NOTE — ED PROVIDER NOTES
Time reflects when diagnosis was documented in both MDM as applicable and the Disposition within this note       Time User Action Codes Description Comment    11/28/2024 12:09 PM Kartik Zavaleta Add [M79.601] Right arm pain           ED Disposition       ED Disposition   Discharge    Condition   Stable    Date/Time   Thu Nov 28, 2024 12:09 PM    Comment   Howard Fulton discharge to home/self care.                   Assessment & Plan       Medical Decision Making  This patient presents with right arm pain.   Diagnostic considerations include cervical radiculopathy, ACS, PE, arterial embolism, DVT.    Vital signs reviewed.  Patient presents with right arm pain.  Nontraumatic.  ECG is without acute ischemic changes.  Troponin x 2 negative.  D-dimer negative.  Low suspicion for PE/acute DVT.  The distal aspect of the right upper extremity is neurovascularly intact.  Brisk capillary refill.  Strong radial pulse palpated.  Pain improved with IV Toradol.  No clear source for the patient's discomfort.  The patient denies chronic neck pain.  Negative Spurling's.  Recommendation/return precautions were discussed with the patient.  All questions addressed.  Stable for discharge.        Problems Addressed:  Right arm pain: acute illness or injury    Amount and/or Complexity of Data Reviewed  Labs: ordered.     Details: No significant laboratory abnormalities.  Troponin x 2 negative.  D-dimer negative.  ECG/medicine tests: ordered and independent interpretation performed.     Details: ECG interpreted by me.  Normal sinus rhythm.  Heart rate 62 bpm.  Leftward axis.  No acute ischemic changes.  No signs of unstable arrhythmia.    Risk  Prescription drug management.      Medications   ketorolac (TORADOL) injection 15 mg (15 mg Intravenous Given 11/28/24 1022)     ED Risk Strat Scores           SBIRT 22yo+      Flowsheet Row Most Recent Value   Initial Alcohol Screen: US AUDIT-C     1. How often do you have a drink containing  "alcohol? 0 Filed at: 11/28/2024 0935   2. How many drinks containing alcohol do you have on a typical day you are drinking?  0 Filed at: 11/28/2024 0935   3a. Male UNDER 65: How often do you have five or more drinks on one occasion? 0 Filed at: 11/28/2024 0935   3b. FEMALE Any Age, or MALE 65+: How often do you have 4 or more drinks on one occassion? 0 Filed at: 11/28/2024 0935   Audit-C Score 0 Filed at: 11/28/2024 0935   ALLISON: How many times in the past year have you...    Used an illegal drug or used a prescription medication for non-medical reasons? Never Filed at: 11/28/2024 0935          History of Present Illness       Chief Complaint   Patient presents with    Arm Pain     Patient reports while working on his computer at home, started with right arm pain and aching around 0700. Denies other complaints.     Past Medical History:   Diagnosis Date    Benign prostatic hyperplasia with nocturia     Elevated PSA     Hypothyroidism       History reviewed. No pertinent surgical history.   History reviewed. No pertinent family history.   Social History     Tobacco Use    Smoking status: Never    Smokeless tobacco: Never   Vaping Use    Vaping status: Never Used   Substance Use Topics    Alcohol use: Never    Drug use: Not Currently      E-Cigarette/Vaping    E-Cigarette Use Never User       E-Cigarette/Vaping Substances      I have reviewed and agree with the history as documented.     Patient presents with right arm pain. He states that he developed right arm pain while sitting at this computer at approximately 0700 this AM. He denies recent injury to the right arm. Hasn't been using the right arm more than usual. Denies right arm weakness but expresses mild numbness/tingling in the finger tips. Denies neck pain/headache/dizziness. Did not take anything for pain PTA. The patient contacted his PCP who recommended ED evaluation.       History provided by:  Patient and spouse  Arm Pain  Location:  \"The entire right " "arm\"  Quality:  Achy  Severity:  Moderate  Onset quality:  Sudden  Duration:  3 hours  Timing:  Constant  Progression:  Unchanged  Chronicity:  New  Associated symptoms: myalgias    Associated symptoms: no abdominal pain, no chest pain, no cough, no fatigue, no headaches, no nausea, no rash, no shortness of breath, no vomiting and no wheezing      Review of Systems   Constitutional:  Negative for activity change, appetite change and fatigue.   Respiratory:  Negative for cough, chest tightness, shortness of breath and wheezing.    Cardiovascular:  Negative for chest pain and palpitations.   Gastrointestinal:  Negative for abdominal pain, nausea and vomiting.   Musculoskeletal:  Positive for myalgias. Negative for arthralgias, back pain, joint swelling, neck pain and neck stiffness.   Skin:  Negative for color change, pallor, rash and wound.   Neurological:  Negative for dizziness, syncope, speech difficulty, weakness, light-headedness and headaches.   Hematological:  Does not bruise/bleed easily.   Psychiatric/Behavioral:  Negative for confusion and decreased concentration.    All other systems reviewed and are negative.    Objective       ED Triage Vitals   Temperature Pulse Blood Pressure Respirations SpO2 Patient Position - Orthostatic VS   11/28/24 0931 11/28/24 0931 11/28/24 0931 11/28/24 0931 11/28/24 0931 11/28/24 0945   98 °F (36.7 °C) 58 (!) 194/91 17 97 % Sitting      Temp Source Heart Rate Source BP Location FiO2 (%) Pain Score    11/28/24 0931 11/28/24 0931 11/28/24 0931 -- 11/28/24 0931    Temporal Monitor Right arm  3      Vitals      Date and Time Temp Pulse SpO2 Resp BP Pain Score FACES Pain Rating User   11/28/24 1100 -- 59 96 % 18 145/81 -- -- AS   11/28/24 1022 -- -- -- -- -- 3 -- AS   11/28/24 1000 -- 58 95 % 20 156/86 -- -- AS   11/28/24 0945 -- 64 96 % 18 189/85 -- -- AS   11/28/24 0931 98 °F (36.7 °C) 58 97 % 17 194/91 3 -- AS          Physical Exam  Vitals and nursing note reviewed. "   Constitutional:       General: He is not in acute distress.     Appearance: He is not ill-appearing or toxic-appearing.   HENT:      Head: Normocephalic and atraumatic.      Right Ear: External ear normal.      Left Ear: External ear normal.      Nose: No congestion or rhinorrhea.   Eyes:      General: No scleral icterus.        Right eye: No discharge.         Left eye: No discharge.      Extraocular Movements: Extraocular movements intact.      Conjunctiva/sclera: Conjunctivae normal.   Cardiovascular:      Rate and Rhythm: Normal rate and regular rhythm.      Pulses: Normal pulses.      Heart sounds: Normal heart sounds. No murmur heard.  Pulmonary:      Effort: Pulmonary effort is normal. No respiratory distress.      Breath sounds: Normal breath sounds. No stridor. No wheezing, rhonchi or rales.   Chest:      Chest wall: No tenderness.   Abdominal:      General: Bowel sounds are normal. There is no distension.      Palpations: Abdomen is soft.      Tenderness: There is no abdominal tenderness. There is no guarding or rebound.   Musculoskeletal:         General: No swelling, tenderness, deformity or signs of injury.      Cervical back: Normal range of motion and neck supple. No tenderness.      Right lower leg: No edema.      Left lower leg: No edema.      Comments: Symmetric upper extremity strength,  strength.  Strong/palpable right radial pulse.  Brisk cap refill of the distal aspect of the right upper extremity.  No tenderness to palpation along the right upper extremity.   Skin:     General: Skin is warm and dry.      Coloration: Skin is not jaundiced or pale.   Neurological:      General: No focal deficit present.      Mental Status: He is alert and oriented to person, place, and time.   Psychiatric:         Mood and Affect: Mood normal.         Behavior: Behavior normal.       Results Reviewed       Procedure Component Value Units Date/Time    HS Troponin I 2hr [[id number]]  (Normal) Collected:  11/28/24 1141    Lab Status: Final result Specimen: Blood from Arm, Right Updated: 11/28/24 1207     hs TnI 2hr 9 ng/L      Delta 2hr hsTnI 0 ng/L     HS Troponin I 4hr [[id number]]     Lab Status: No result Specimen: Blood     D-dimer, quantitative [[id number]]  (Abnormal) Collected: 11/28/24 0950    Lab Status: Final result Specimen: Blood from Arm, Right Updated: 11/28/24 1126     D-Dimer, Quant 0.57 ug/ml FEU     Narrative:      In the evaluation for possible pulmonary embolism, in the appropriate (Well's Score of 4 or less) patient, the age adjusted d-dimer cutoff for this patient can be calculated as:    Age x 0.01 (in ug/mL) for Age-adjusted D-dimer exclusion threshold for a patient over 50 years.    HS Troponin 0hr (reflex protocol) [[id number]]  (Normal) Collected: 11/28/24 0950    Lab Status: Final result Specimen: Blood from Arm, Right Updated: 11/28/24 1021     hs TnI 0hr 9 ng/L     Basic metabolic panel [[id number]] Collected: 11/28/24 0950    Lab Status: Final result Specimen: Blood from Arm, Right Updated: 11/28/24 1017     Sodium 136 mmol/L      Potassium 4.0 mmol/L      Chloride 104 mmol/L      CO2 26 mmol/L      ANION GAP 6 mmol/L      BUN 15 mg/dL      Creatinine 0.90 mg/dL      Glucose 97 mg/dL      Calcium 9.0 mg/dL      eGFR 79 ml/min/1.73sq m     Narrative:      [employer] guidelines for Chronic Kidney Disease (CKD):     Stage 1 with normal or high GFR (GFR > 90 mL/min/1.73 square meters)    Stage 2 Mild CKD (GFR = 60-89 mL/min/1.73 square meters)    Stage 3A Moderate CKD (GFR = 45-59 mL/min/1.73 square meters)    Stage 3B Moderate CKD (GFR = 30-44 mL/min/1.73 square meters)    Stage 4 Severe CKD (GFR = 15-29 mL/min/1.73 square meters)    Stage 5 End Stage CKD (GFR <15 mL/min/1.73 square meters)  Note: GFR calculation is accurate only with a steady state creatinine    CBC and differential [[id number]]  (Abnormal) Collected: 11/28/24 0950    Lab Status: Final result Specimen:  Blood from Arm, Right Updated: 11/28/24 0956     WBC 4.38 Thousand/uL      RBC 4.88 Million/uL      Hemoglobin 15.1 g/dL      Hematocrit 44.7 %      MCV 92 fL      MCH 30.9 pg      MCHC 33.8 g/dL      RDW 13.1 %      MPV 9.5 fL      Platelets 161 Thousands/uL      nRBC 0 /100 WBCs      Segmented % 74 %      Immature Grans % 1 %      Lymphocytes % 13 %      Monocytes % 10 %      Eosinophils Relative 1 %      Basophils Relative 1 %      Absolute Neutrophils 3.29 Thousands/µL      Absolute Immature Grans 0.02 Thousand/uL      Absolute Lymphocytes 0.56 Thousands/µL      Absolute Monocytes 0.45 Thousand/µL      Eosinophils Absolute 0.03 Thousand/µL      Basophils Absolute 0.03 Thousands/µL           No orders to display     Procedures    ED Medication and Procedure Management   Prior to Admission Medications   Prescriptions Last Dose Informant Patient Reported? Taking?   Apple Cider Vinegar 188 MG CAPS   Yes No   Sig: Take by mouth   Cetirizine HCl (ZyrTEC Allergy) 10 MG CAPS   Yes No   Sig: Take by mouth   Multiple Vitamin (multivitamin) tablet   Yes No   Sig: Take 1 tablet by mouth daily   Turmeric 500 MG CAPS   Yes No   Sig: Take by mouth   levothyroxine 100 mcg tablet 11/28/2024 at  4:00 AM  Yes Yes   Sig: Take 100 mcg by mouth daily      Facility-Administered Medications: None     Discharge Medication List as of 11/28/2024 12:10 PM        CONTINUE these medications which have NOT CHANGED    Details   levothyroxine 100 mcg tablet Take 100 mcg by mouth daily, Historical Med      Apple Cider Vinegar 188 MG CAPS Take by mouth, Historical Med      Cetirizine HCl (ZyrTEC Allergy) 10 MG CAPS Take by mouth, Historical Med      Multiple Vitamin (multivitamin) tablet Take 1 tablet by mouth daily, Historical Med      Turmeric 500 MG CAPS Take by mouth, Historical Med           No discharge procedures on file.  ED SEPSIS DOCUMENTATION   Time reflects when diagnosis was documented in both MDM as applicable and the Disposition  within this note       Time User Action Codes Description Comment    11/28/2024 12:09 PM Kartik Zavaleta Add [M79.601] Right arm pain                  Kartik Zavaleta,   11/28/24 1310

## 2024-11-29 LAB
ATRIAL RATE: 62 BPM
P AXIS: 63 DEGREES
PR INTERVAL: 194 MS
QRS AXIS: -20 DEGREES
QRSD INTERVAL: 114 MS
QT INTERVAL: 396 MS
QTC INTERVAL: 401 MS
T WAVE AXIS: 70 DEGREES
VENTRICULAR RATE: 62 BPM

## 2025-02-03 ENCOUNTER — HOSPITAL ENCOUNTER (EMERGENCY)
Facility: HOSPITAL | Age: 85
Discharge: HOME | End: 2025-02-03
Attending: STUDENT IN AN ORGANIZED HEALTH CARE EDUCATION/TRAINING PROGRAM
Payer: COMMERCIAL

## 2025-02-03 ENCOUNTER — APPOINTMENT (EMERGENCY)
Dept: RADIOLOGY | Facility: HOSPITAL | Age: 85
End: 2025-02-03
Payer: COMMERCIAL

## 2025-02-03 VITALS
DIASTOLIC BLOOD PRESSURE: 76 MMHG | OXYGEN SATURATION: 95 % | RESPIRATION RATE: 20 BRPM | WEIGHT: 208.78 LBS | SYSTOLIC BLOOD PRESSURE: 139 MMHG | TEMPERATURE: 98.4 F | BODY MASS INDEX: 29.23 KG/M2 | HEIGHT: 71 IN | HEART RATE: 78 BPM

## 2025-02-03 DIAGNOSIS — E83.42 HYPOMAGNESEMIA: ICD-10-CM

## 2025-02-03 DIAGNOSIS — E87.1 HYPONATREMIA: ICD-10-CM

## 2025-02-03 DIAGNOSIS — E86.0 DEHYDRATION: ICD-10-CM

## 2025-02-03 DIAGNOSIS — M25.551 RIGHT HIP PAIN: Primary | ICD-10-CM

## 2025-02-03 DIAGNOSIS — E87.6 HYPOKALEMIA: ICD-10-CM

## 2025-02-03 LAB
ANION GAP SERPL CALCULATED.3IONS-SCNC: 7 MMOL/L (ref 4–13)
BASOPHILS # BLD AUTO: 0.07 THOUSANDS/ΜL (ref 0–0.1)
BASOPHILS NFR BLD AUTO: 1 % (ref 0–1)
BUN SERPL-MCNC: 17 MG/DL (ref 5–25)
CALCIUM SERPL-MCNC: 8.9 MG/DL (ref 8.4–10.2)
CHLORIDE SERPL-SCNC: 99 MMOL/L (ref 96–108)
CK SERPL-CCNC: 37 U/L (ref 39–308)
CO2 SERPL-SCNC: 26 MMOL/L (ref 21–32)
CREAT SERPL-MCNC: 0.88 MG/DL (ref 0.6–1.3)
EOSINOPHIL # BLD AUTO: 0.05 THOUSAND/ΜL (ref 0–0.61)
EOSINOPHIL NFR BLD AUTO: 1 % (ref 0–6)
ERYTHROCYTE [DISTWIDTH] IN BLOOD BY AUTOMATED COUNT: 13.2 % (ref 11.6–15.1)
FLUAV AG UPPER RESP QL IA.RAPID: NEGATIVE
FLUBV AG UPPER RESP QL IA.RAPID: NEGATIVE
GFR SERPL CREATININE-BSD FRML MDRD: 79 ML/MIN/1.73SQ M
GLUCOSE SERPL-MCNC: 155 MG/DL (ref 65–140)
HCT VFR BLD AUTO: 42.1 % (ref 36.5–49.3)
HGB BLD-MCNC: 14.1 G/DL (ref 12–17)
IMM GRANULOCYTES # BLD AUTO: 0.22 THOUSAND/UL (ref 0–0.2)
IMM GRANULOCYTES NFR BLD AUTO: 2 % (ref 0–2)
LYMPHOCYTES # BLD AUTO: 0.75 THOUSANDS/ΜL (ref 0.6–4.47)
LYMPHOCYTES NFR BLD AUTO: 8 % (ref 14–44)
MAGNESIUM SERPL-MCNC: 1.8 MG/DL (ref 1.9–2.7)
MCH RBC QN AUTO: 30.3 PG (ref 26.8–34.3)
MCHC RBC AUTO-ENTMCNC: 33.5 G/DL (ref 31.4–37.4)
MCV RBC AUTO: 91 FL (ref 82–98)
MONOCYTES # BLD AUTO: 0.79 THOUSAND/ΜL (ref 0.17–1.22)
MONOCYTES NFR BLD AUTO: 8 % (ref 4–12)
NEUTROPHILS # BLD AUTO: 7.79 THOUSANDS/ΜL (ref 1.85–7.62)
NEUTS SEG NFR BLD AUTO: 80 % (ref 43–75)
NRBC BLD AUTO-RTO: 0 /100 WBCS
PLATELET # BLD AUTO: 317 THOUSANDS/UL (ref 149–390)
PMV BLD AUTO: 9.2 FL (ref 8.9–12.7)
POTASSIUM SERPL-SCNC: 3.2 MMOL/L (ref 3.5–5.3)
PROCALCITONIN SERPL-MCNC: 0.1 NG/ML
RBC # BLD AUTO: 4.65 MILLION/UL (ref 3.88–5.62)
SARS-COV+SARS-COV-2 AG RESP QL IA.RAPID: NEGATIVE
SODIUM SERPL-SCNC: 132 MMOL/L (ref 135–147)
WBC # BLD AUTO: 9.67 THOUSAND/UL (ref 4.31–10.16)

## 2025-02-03 PROCEDURE — 73502 X-RAY EXAM HIP UNI 2-3 VIEWS: CPT

## 2025-02-03 PROCEDURE — 96361 HYDRATE IV INFUSION ADD-ON: CPT

## 2025-02-03 PROCEDURE — 87804 INFLUENZA ASSAY W/OPTIC: CPT

## 2025-02-03 PROCEDURE — 82550 ASSAY OF CK (CPK): CPT | Performed by: STUDENT IN AN ORGANIZED HEALTH CARE EDUCATION/TRAINING PROGRAM

## 2025-02-03 PROCEDURE — 96365 THER/PROPH/DIAG IV INF INIT: CPT

## 2025-02-03 PROCEDURE — 96375 TX/PRO/DX INJ NEW DRUG ADDON: CPT

## 2025-02-03 PROCEDURE — 99285 EMERGENCY DEPT VISIT HI MDM: CPT | Performed by: STUDENT IN AN ORGANIZED HEALTH CARE EDUCATION/TRAINING PROGRAM

## 2025-02-03 PROCEDURE — 80048 BASIC METABOLIC PNL TOTAL CA: CPT | Performed by: STUDENT IN AN ORGANIZED HEALTH CARE EDUCATION/TRAINING PROGRAM

## 2025-02-03 PROCEDURE — 71045 X-RAY EXAM CHEST 1 VIEW: CPT

## 2025-02-03 PROCEDURE — 84145 PROCALCITONIN (PCT): CPT | Performed by: STUDENT IN AN ORGANIZED HEALTH CARE EDUCATION/TRAINING PROGRAM

## 2025-02-03 PROCEDURE — 99283 EMERGENCY DEPT VISIT LOW MDM: CPT

## 2025-02-03 PROCEDURE — 87811 SARS-COV-2 COVID19 W/OPTIC: CPT

## 2025-02-03 PROCEDURE — 85025 COMPLETE CBC W/AUTO DIFF WBC: CPT | Performed by: STUDENT IN AN ORGANIZED HEALTH CARE EDUCATION/TRAINING PROGRAM

## 2025-02-03 PROCEDURE — 87040 BLOOD CULTURE FOR BACTERIA: CPT | Performed by: STUDENT IN AN ORGANIZED HEALTH CARE EDUCATION/TRAINING PROGRAM

## 2025-02-03 PROCEDURE — 36415 COLL VENOUS BLD VENIPUNCTURE: CPT | Performed by: STUDENT IN AN ORGANIZED HEALTH CARE EDUCATION/TRAINING PROGRAM

## 2025-02-03 PROCEDURE — 83735 ASSAY OF MAGNESIUM: CPT | Performed by: STUDENT IN AN ORGANIZED HEALTH CARE EDUCATION/TRAINING PROGRAM

## 2025-02-03 RX ORDER — POTASSIUM CHLORIDE 1500 MG/1
40 TABLET, EXTENDED RELEASE ORAL ONCE
Status: COMPLETED | OUTPATIENT
Start: 2025-02-03 | End: 2025-02-03

## 2025-02-03 RX ORDER — KETOROLAC TROMETHAMINE 30 MG/ML
15 INJECTION, SOLUTION INTRAMUSCULAR; INTRAVENOUS ONCE
Status: COMPLETED | OUTPATIENT
Start: 2025-02-03 | End: 2025-02-03

## 2025-02-03 RX ORDER — MAGNESIUM SULFATE HEPTAHYDRATE 40 MG/ML
2 INJECTION, SOLUTION INTRAVENOUS ONCE
Status: COMPLETED | OUTPATIENT
Start: 2025-02-03 | End: 2025-02-03

## 2025-02-03 RX ADMIN — MAGNESIUM SULFATE HEPTAHYDRATE 2 G: 40 INJECTION, SOLUTION INTRAVENOUS at 12:45

## 2025-02-03 RX ADMIN — SODIUM CHLORIDE 1000 ML: 0.9 INJECTION, SOLUTION INTRAVENOUS at 11:34

## 2025-02-03 RX ADMIN — POTASSIUM CHLORIDE 40 MEQ: 1500 TABLET, EXTENDED RELEASE ORAL at 12:44

## 2025-02-03 RX ADMIN — KETOROLAC TROMETHAMINE 15 MG: 30 INJECTION, SOLUTION INTRAMUSCULAR; INTRAVENOUS at 12:44

## 2025-02-03 NOTE — DISCHARGE INSTRUCTIONS
The laboratory studies that were obtained showed signs of mild dehydration.  Drink plenty of clear/hydrating/electrolyte rich fluids over the next few days.    The x-rays that were obtained did not show any significant findings.    Continue all prescribed indications, including the antibiotic.    Follow-up with your primary care provider.  Return to the emergency department for any concerning signs or symptoms.

## 2025-02-03 NOTE — ED PROVIDER NOTES
Time reflects when diagnosis was documented in both MDM as applicable and the Disposition within this note       Time User Action Codes Description Comment    2/3/2025  2:16 PM AngelmagalyKartik fregoso Add [M25.551] Right hip pain     2/3/2025  2:16 PM HalKetty casasony Add [E87.1] Hyponatremia     2/3/2025  2:16 PM Halupa, Kartik Add [E83.42] Hypomagnesemia     2/3/2025  2:16 PM Halupa, Kartik Add [E87.6] Hypokalemia     2/3/2025  2:16 PM Halupa, Kartik Add [E86.0] Dehydration           ED Disposition       ED Disposition   Discharge    Condition   Stable    Date/Time   Mon Feb 3, 2025  2:16 PM    Comment   Howard Fulton discharge to home/self care.                   Assessment & Plan       Medical Decision Making  This patient presents with cough, right hip pain.   Diagnostic considerations include pneumonia, hip fracture, URI, bronchitis, pneumothorax.    11:20 AM  Vital signs reviewed.  No signs of respiratory distress.  Per the patient's wife, he was diagnosed with pneumonia last week.  Patient expressing right hip pain today.  Denies trauma.  No tenderness to palpation along the right hip.  Pain is exacerbated with external rotation of the hip.  Lung exam is largely unremarkable.  Will obtain chest x-ray, hip x-ray.  Will hydrate IV fluid bolus, obtain labs.  Family's concern for septic arthritis secondary to pneumonia.  Patient is currently taking doxycycline.    12:33 PM  Mild hypomagnesemia/hypokalemia secondary to decreased oral intake, dehydration.  IV fluids administered.  Electrolytes replaced.  Chest x-ray with signs of questionable right lower lobe infiltrate consistent with previous pneumonia diagnosis.  Right hip x-ray without acute findings.  Given the patient's history/clinical exam/workup, low suspicion for septic arthritis of the right hip.    2:15 PM  Upon reevaluation, the patient does not appear to be in distress. Tolerating PO. Ambulated independently. Low suspicion for infected joint. The soft  tissue overlying the hip is not cellulitic appearing. Recommendations/return precautions discussed with the patient and his wife. All questions addressed. Stable for discharge.         Problems Addressed:  Dehydration: acute illness or injury  Hypokalemia: acute illness or injury  Hypomagnesemia: acute illness or injury  Hyponatremia: acute illness or injury  Right hip pain: acute illness or injury    Amount and/or Complexity of Data Reviewed  Labs: ordered.  Radiology: ordered and independent interpretation performed.     Details: CXR imaging interpreted by me--No acute findings noted on hip XRs. Questionable right sided infiltrates (known hx of pneumonia).     Risk  Prescription drug management.        Medications   sodium chloride 0.9 % bolus 1,000 mL (0 mL Intravenous Stopped 2/3/25 1406)   potassium chloride (Klor-Con M20) CR tablet 40 mEq (40 mEq Oral Given 2/3/25 1244)   magnesium sulfate 2 g/50 mL IVPB (premix) 2 g (0 g Intravenous Stopped 2/3/25 1406)   ketorolac (TORADOL) injection 15 mg (15 mg Intravenous Given 2/3/25 1244)       ED Risk Strat Scores                          SBIRT 22yo+      Flowsheet Row Most Recent Value   Initial Alcohol Screen: US AUDIT-C     1. How often do you have a drink containing alcohol? 0 Filed at: 02/03/2025 1113   2. How many drinks containing alcohol do you have on a typical day you are drinking?  0 Filed at: 02/03/2025 1113   3b. FEMALE Any Age, or MALE 65+: How often do you have 4 or more drinks on one occassion? 0 Filed at: 02/03/2025 1113   Audit-C Score 0 Filed at: 02/03/2025 1113   ALLISON: How many times in the past year have you...    Used an illegal drug or used a prescription medication for non-medical reasons? Never Filed at: 02/03/2025 1113                            History of Present Illness       Chief Complaint   Patient presents with    Hip Pain     Pt dx with pneumonia on Thursday- per wife pt then started with right hip pain yesterday- pt denies fall- per  wife patient pneumonia symptoms are not worsening        Past Medical History:   Diagnosis Date    Benign prostatic hyperplasia with nocturia     Elevated PSA     Hypothyroidism       History reviewed. No pertinent surgical history.   History reviewed. No pertinent family history.   Social History     Tobacco Use    Smoking status: Never    Smokeless tobacco: Never   Vaping Use    Vaping status: Never Used   Substance Use Topics    Alcohol use: Never    Drug use: Not Currently      E-Cigarette/Vaping    E-Cigarette Use Never User       E-Cigarette/Vaping Substances      I have reviewed and agree with the history as documented.       History provided by:  Patient and spouse  Cough  Cough characteristics:  Non-productive  Severity:  Moderate  Onset quality:  Gradual  Timing:  Intermittent  Progression:  Waxing and waning  Chronicity:  New  Smoker: no    Context comment:  The patient was recently dx'd with PNA. Taking abx. The patient denies shortness of breath, wheezing. No fevers. Eating/drinking well.  Associated symptoms: sinus congestion    Associated symptoms: no chest pain, no chills, no ear fullness, no ear pain, no eye discharge, no fever, no myalgias, no rash, no rhinorrhea, no shortness of breath and no wheezing    Risk factors: recent infection      Review of Systems   Constitutional:  Negative for activity change, appetite change, chills and fever.   HENT:  Negative for ear pain and rhinorrhea.    Eyes:  Negative for discharge.   Respiratory:  Positive for cough. Negative for chest tightness, shortness of breath and wheezing.    Cardiovascular:  Negative for chest pain and palpitations.   Gastrointestinal:  Negative for abdominal pain, diarrhea, nausea and vomiting.   Musculoskeletal:  Positive for arthralgias and gait problem. Negative for joint swelling, myalgias, neck pain and neck stiffness.   Skin:  Negative for color change, rash and wound.   All other systems reviewed and are negative.    Objective        ED Triage Vitals   Temperature Pulse Blood Pressure Respirations SpO2 Patient Position - Orthostatic VS   02/03/25 1115 02/03/25 1115 02/03/25 1115 02/03/25 1115 02/03/25 1115 02/03/25 1115   98.4 °F (36.9 °C) 96 156/82 18 97 % Sitting      Temp Source Heart Rate Source BP Location FiO2 (%) Pain Score    02/03/25 1115 02/03/25 1115 02/03/25 1115 -- 02/03/25 1244    Temporal Monitor Left arm  8      Vitals      Date and Time Temp Pulse SpO2 Resp BP Pain Score FACES Pain Rating User   02/03/25 1400 -- 78 95 % 20 139/76 -- -- LAK   02/03/25 1345 -- 79 95 % 22 -- -- -- LAK   02/03/25 1330 -- 79 93 % 24 136/71 -- -- LAK   02/03/25 1317 -- -- -- -- -- 6 -- LAK   02/03/25 1245 -- 85 93 % 25 131/72 -- -- LAK   02/03/25 1244 -- -- -- -- -- 8 -- LAK   02/03/25 1200 -- 83 94 % 20 153/82 -- -- LAK   02/03/25 1145 -- 87 -- 20 149/76 -- -- LAK   02/03/25 1130 -- 91 96 % -- 151/78 -- -- LAK   02/03/25 1115 98.4 °F (36.9 °C) 96 97 % 18 156/82 -- -- SS            Physical Exam  Vitals and nursing note reviewed.   Constitutional:       General: He is not in acute distress.     Appearance: He is not ill-appearing or toxic-appearing.   HENT:      Head: Normocephalic and atraumatic.      Right Ear: External ear normal.      Left Ear: External ear normal.      Nose: No congestion or rhinorrhea.   Eyes:      General: No scleral icterus.        Right eye: No discharge.         Left eye: No discharge.      Extraocular Movements: Extraocular movements intact.      Conjunctiva/sclera: Conjunctivae normal.   Cardiovascular:      Rate and Rhythm: Normal rate and regular rhythm.      Pulses: Normal pulses.      Heart sounds: Normal heart sounds. No murmur heard.  Pulmonary:      Effort: Pulmonary effort is normal. No respiratory distress.      Breath sounds: Normal breath sounds. No wheezing, rhonchi or rales.   Chest:      Chest wall: No tenderness.   Abdominal:      General: Bowel sounds are normal.      Palpations: Abdomen is soft.       Tenderness: There is no abdominal tenderness. There is no right CVA tenderness, left CVA tenderness or guarding.   Musculoskeletal:         General: Tenderness present. No swelling or signs of injury.      Right lower leg: No edema.      Left lower leg: No edema.      Comments: No TTP along the right greater trochanter. Tenderness noted with external rotation of the hip. No pain with internal rotation. No overlying skin changes.    Skin:     General: Skin is warm and dry.      Coloration: Skin is not jaundiced.      Findings: No bruising or erythema.   Neurological:      Mental Status: He is alert and oriented to person, place, and time. Mental status is at baseline.         Results Reviewed       Procedure Component Value Units Date/Time    Blood culture #1 [[id number]] Collected: 02/03/25 1132    Lab Status: Preliminary result Specimen: Blood from Arm, Left Updated: 02/04/25 1901     Blood Culture No Growth at 24 hrs.    Blood culture #2 [[id number]] Collected: 02/03/25 1132    Lab Status: Preliminary result Specimen: Blood from Arm, Right Updated: 02/04/25 1901     Blood Culture No Growth at 24 hrs.    Basic metabolic panel [[id number]]  (Abnormal) Collected: 02/03/25 1120    Lab Status: Final result Specimen: Blood from Arm, Right Updated: 02/03/25 1226     Sodium 132 mmol/L      Potassium 3.2 mmol/L      Chloride 99 mmol/L      CO2 26 mmol/L      ANION GAP 7 mmol/L      BUN 17 mg/dL      Creatinine 0.88 mg/dL      Glucose 155 mg/dL      Calcium 8.9 mg/dL      eGFR 79 ml/min/1.73sq m     Narrative:      [employer] Kid[employer] guidelines for Chronic Kidney Disease (CKD):     Stage 1 with normal or high GFR (GFR > 90 mL/min/1.73 square meters)    Stage 2 Mild CKD (GFR = 60-89 mL/min/1.73 square meters)    Stage 3A Moderate CKD (GFR = 45-59 mL/min/1.73 square meters)    Stage 3B Moderate CKD (GFR = 30-44 mL/min/1.73 square meters)    Stage 4 Severe CKD (GFR = 15-29 mL/min/1.73 square meters)    Stage 5 End  Stage CKD (GFR <15 mL/min/1.73 square meters)  Note: GFR calculation is accurate only with a steady state creatinine    Magnesium [[id number]]  (Abnormal) Collected: 02/03/25 1120    Lab Status: Final result Specimen: Blood from Arm, Right Updated: 02/03/25 1226     Magnesium 1.8 mg/dL     Procalcitonin [[id number]]  (Normal) Collected: 02/03/25 1120    Lab Status: Final result Specimen: Blood from Arm, Right Updated: 02/03/25 1226     Procalcitonin 0.10 ng/ml     FLU/COVID Rapid Antigen (30 min. TAT) - Preferred screening test in ED [[id number]]  (Normal) Collected: 02/03/25 1214    Lab Status: Final result Specimen: Nares from Nose Updated: 02/03/25 1216     SARS COV Rapid Antigen Negative     Influenza A Rapid Antigen Negative     Influenza B Rapid Antigen Negative    Narrative:      This test has been performed using the [employer] Nandini 2 FLU+SARS Antigen test under the Emergency Use Authorization (EUA). This test has been validated by the  and verified by the performing laboratory. The Nandini uses lateral flow immunofluorescent sandwich assay to detect SARS-COV, Influenza A and Influenza B Antigen.     The Quidel Nandini 2 SARS Antigen test does not differentiate between SARS-CoV and SARS-CoV-2.     Negative results are presumptive and may be confirmed with a molecular assay, if necessary, for patient management. Negative results do not rule out SARS-CoV-2 or influenza infection and should not be used as the sole basis for treatment or patient management decisions. A negative test result may occur if the level of antigen in a sample is below the limit of detection of this test.     Positive results are indicative of the presence of viral antigens, but do not rule out bacterial infection or co-infection with other viruses.     All test results should be used as an adjunct to clinical observations and other information available to the provider.    FOR PEDIATRIC PATIENTS - copy/paste COVID Guidelines URL to  browser: [email or web address]    COVID/FLU/RSV [[id number]] Updated: 02/03/25 1207    Lab Status: No result Specimen: Nares from Nose     CK [[id number]]  (Abnormal) Collected: 02/03/25 1120    Lab Status: Final result Specimen: Blood from Arm, Right Updated: 02/03/25 1149     Total CK 37 U/L     CBC and differential [[id number]]  (Abnormal) Collected: 02/03/25 1120    Lab Status: Final result Specimen: Blood from Arm, Right Updated: 02/03/25 1128     WBC 9.67 Thousand/uL      RBC 4.65 Million/uL      Hemoglobin 14.1 g/dL      Hematocrit 42.1 %      MCV 91 fL      MCH 30.3 pg      MCHC 33.5 g/dL      RDW 13.2 %      MPV 9.2 fL      Platelets 317 Thousands/uL      nRBC 0 /100 WBCs      Segmented % 80 %      Immature Grans % 2 %      Lymphocytes % 8 %      Monocytes % 8 %      Eosinophils Relative 1 %      Basophils Relative 1 %      Absolute Neutrophils 7.79 Thousands/µL      Absolute Immature Grans 0.22 Thousand/uL      Absolute Lymphocytes 0.75 Thousands/µL      Absolute Monocytes 0.79 Thousand/µL      Eosinophils Absolute 0.05 Thousand/µL      Basophils Absolute 0.07 Thousands/µL             XR chest 1 view portable   ED Interpretation by Kartik Zavaleta DO (02/03 1204)   RLL airspace opacities.       Final Interpretation by Ryan Ta MD (02/03 1252)      No acute cardiopulmonary disease on this examination, which is limited secondary to low lung volumes.            Workstation performed: [id number]         XR hip/pelv 2-3 vws right if performed   ED Interpretation by Kartik Zavaleta DO (02/03 1157)   No acute osseous findings.      Final Interpretation by Ryan Ta MD (02/03 1253)      No acute osseous abnormality.         Computerized Assisted Algorithm (CAA) may have been used to analyze all applicable images.            Workstation performed: [id number]             Procedures    ED Medication and Procedure Management   Prior to  Admission Medications   Prescriptions Last Dose Informant Patient Reported? Taking?   Apple Cider Vinegar 188 MG CAPS   Yes No   Sig: Take by mouth   Cetirizine HCl (ZyrTEC Allergy) 10 MG CAPS   Yes No   Sig: Take by mouth   Multiple Vitamin (multivitamin) tablet   Yes No   Sig: Take 1 tablet by mouth daily   Turmeric 500 MG CAPS   Yes No   Sig: Take by mouth   levothyroxine 100 mcg tablet   Yes No   Sig: Take 100 mcg by mouth daily      Facility-Administered Medications: None     Discharge Medication List as of 2/3/2025  2:17 PM        CONTINUE these medications which have NOT CHANGED    Details   Apple Cider Vinegar 188 MG CAPS Take by mouth, Historical Med      Cetirizine HCl (ZyrTEC Allergy) 10 MG CAPS Take by mouth, Historical Med      levothyroxine 100 mcg tablet Take 100 mcg by mouth daily, Historical Med      Multiple Vitamin (multivitamin) tablet Take 1 tablet by mouth daily, Historical Med      Turmeric 500 MG CAPS Take by mouth, Historical Med           No discharge procedures on file.  ED SEPSIS DOCUMENTATION   Time reflects when diagnosis was documented in both MDM as applicable and the Disposition within this note       Time User Action Codes Description Comment    2/3/2025  2:16 PM Kartik Zavaleta Add [M25.551] Right hip pain     2/3/2025  2:16 PM Kartik Zavaleta Add [E87.1] Hyponatremia     2/3/2025  2:16 PM Kartik Zavaleta Add [E83.42] Hypomagnesemia     2/3/2025  2:16 PM Kartik Zavaleta Add [E87.6] Hypokalemia     2/3/2025  2:16 PM Kartik Zavaleta Add [E86.0] Dehydration                  Kartik Zavaleta, DO  02/05/25 0805

## 2025-02-08 LAB
BACTERIA BLD CULT: NORMAL
BACTERIA BLD CULT: NORMAL